# Patient Record
Sex: MALE | Race: WHITE | NOT HISPANIC OR LATINO | Employment: FULL TIME | URBAN - METROPOLITAN AREA
[De-identification: names, ages, dates, MRNs, and addresses within clinical notes are randomized per-mention and may not be internally consistent; named-entity substitution may affect disease eponyms.]

---
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
## 2025-02-14 ENCOUNTER — OFFICE VISIT (OUTPATIENT)
Dept: NEUROLOGY | Facility: CLINIC | Age: 20
End: 2025-02-14
Payer: COMMERCIAL

## 2025-02-14 VITALS
SYSTOLIC BLOOD PRESSURE: 118 MMHG | BODY MASS INDEX: 24.52 KG/M2 | HEIGHT: 72 IN | OXYGEN SATURATION: 98 % | WEIGHT: 181 LBS | HEART RATE: 63 BPM | DIASTOLIC BLOOD PRESSURE: 68 MMHG

## 2025-02-14 DIAGNOSIS — R56.9 SEIZURE-LIKE ACTIVITY: Primary | ICD-10-CM

## 2025-02-14 NOTE — PROGRESS NOTES
Neuro Office Visit      Encounter Date: 2025   Patient Name: Susana Toscano  : 2005   MRN: [id number]   PCP:  Sofia Rapp APRN     Chief Complaint:    Chief Complaint   Patient presents with    New onset seizure       History of Present Illness: Susana Toscano is a 19 y.o. male who is here today in Neurology for seizure.  History of Present Illness  Experienced a seizure-like episode in 2024, while on a break at work. The seizure was witnessed by a bystander.    During the time he had the seizure he was working at [employer] and states he had been working very hard and was somewhat dehydrated he had also had a high intake of caffeine.    He took a break and walked out to the patio and recalls walking back inside after the break, grabbing a souffle, taking a bite, going outside and then losing consciousness.  Bystanders also noted that he took several hits off of his vape pen prior to the seizure activity. Informed that fell to the ground after vaping, but does not believe the vape was the cause of the seizure.    No tongue biting or urinary/bowel incontinence.    The next memory he recalled was being placed on the stretcher and loaded into the ambulance.  By the time he arrived at the emergency department he was back to his normal state of health.    This was the first and only seizure-like event. Has resumed driving and is currently employed at [employer].    No known allergies and no diagnosis of health conditions such as asthma or headaches.     Reports no family history of seizures, dementia, or strokes.     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        SOCIAL  Occupations: Works at [employer] in [place]  Alcohol: Consumes alcohol socially  Tobacco: Does not smoke  Recreational Drugs: Uses marijuana for recreational  purposes  Coffee/Tea/Caffeine-containing Drinks: Consumed five shots of espresso on the day of the incident      ALLERGIES  The patient has no known allergies.    EEG (08/26/2024 08:00)     MRI Brain With & Without Contrast (08/25/2024 17:40)     Subjective      Past Medical History: History reviewed. No pertinent past medical history.    Past Surgical History:   Past Surgical History:   Procedure Laterality Date    TONSILLECTOMY      WISDOM TOOTH EXTRACTION         Family History: History reviewed. No pertinent family history.    Social History:   Social History     Socioeconomic History    Marital status: Single   Tobacco Use    Smoking status: Never    Smokeless tobacco: Never   Vaping Use    Vaping status: Some Days    Substances: Nicotine   Substance and Sexual Activity    Alcohol use: Yes     Comment: social    Drug use: Yes     Types: Marijuana     Comment: recreational       Medications:     Current Outpatient Medications:     ondansetron ODT (ZOFRAN-ODT) 4 MG disintegrating tablet, Place 1 tablet on the tongue Every 8 (Eight) Hours As Needed for Nausea., Disp: 15 tablet, Rfl: 0    Allergies:   No Known Allergies    PHQ-9 Total Score:     STEADI Fall Risk Assessment has not been completed.    Objective     Physical Exam:     Neurological Exam  Mental Status  Awake, alert and oriented to person, place and time. Recent and remote memory are intact. Speech is normal. Language is fluent with no aphasia. Attention and concentration are normal. Fund of knowledge is appropriate for level of education.    Cranial Nerves  CN II: Visual acuity is normal.  CN III, IV, VI: Extraocular movements intact bilaterally. Pupils equal round and reactive to light bilaterally.  CN V: Facial sensation is normal.  CN VII: Full and symmetric facial movement.  CN IX, X: Palate elevates symmetrically  CN XI: Shoulder shrug strength is normal.  CN XII: Tongue midline without atrophy or fasciculations.    Motor  Normal muscle bulk  "throughout. No fasciculations present. Normal muscle tone. Strength is 5/5 throughout all four extremities.    Sensory  Sensation is intact to light touch, pinprick, vibration and proprioception in all four extremities.    Reflexes                                            Right                      Left  Brachioradialis                    2+                         2+  Biceps                                 2+                         2+  Triceps                                2+                         2+  Finger flex                           2+                         2+  Hamstring                            2+                         2+  Patellar                                2+                         2+  Achilles                                2+                         2+    Coordination    Finger-to-nose, rapid alternating movements and heel-to-shin normal bilaterally without dysmetria.    Gait  Normal casual, toe, heel and tandem gait.        Vital Signs:   Vitals:    02/14/25 1341   BP: 118/68   Pulse: 63   SpO2: 98%   Weight: 82.1 kg (181 lb)   Height: 182.9 cm (72\")     Body mass index is 24.55 kg/m².     Results:   Results  Labs:   Anion Gap - N/A  CMP - N/A  CO2 - N/A  Lactic Acid - N/A    Radiology:   Brain MRI, Normal structures    Neurological testing:   EEG, Normal     Imaging:   No Images in the past 120 days found..     Labs:   No results found for: \"CMP\", \"PROTEIN\", \"ANTIMOGAB\", \"VXOMYT4EELA\", \"JCVRESULT\", \"QUANTTBGOLD\", \"CBCDIF\", \"IGGALBSER\"     Assessment / Plan      Assessment/Plan:   Diagnoses and all orders for this visit:    1. Seizure-like activity (Primary)  Comments:  No further seizure activity  Return for follow-up if another seizure occurs         Assessment & Plan  1. Seizure.  The seizure-like episode in August 2024 was likely provoked by dehydration and heat exposure. The MRI of the brain and EEG results were normal, and the neurological examination conducted today was also " normal. He has not experienced any further seizure-like activity since the initial episode. He is advised to maintain adequate hydration and ensure sufficient sleep. If another seizure occurs, he should contact the office immediately for further evaluation and potential initiation of medication.    Patient Education:     Reviewed medications, potential side effects and signs and symptoms to report. Discussed risk versus benefits of treatment plan with patient and/or family-including medications, labs and radiology that may be ordered. Addressed questions and concerns during visit. Patient and/or family verbalized understanding and agree with plan. Instructed to call the office with any questions and report to ER with any life-threatening symptoms.     Follow Up:   Return if symptoms worsen or fail to improve.    I spent 40 minutes caring for Susana on this date of service. This time includes time spent by me in the following activities: preparing for the visit, reviewing tests, performing a medically appropriate examination and/or evaluation, counseling and educating the patient/family/caregiver, documenting information in the medical record, and independently interpreting results and communicating that information with the patient/family/caregiver.        During this visit the following were done:  Labs Reviewed [x]    Labs Ordered []    Radiology Reports Reviewed [x]    Radiology Ordered []    PCP Records Reviewed []    Referring Provider Records Reviewed [x]    ER Records Reviewed []    Hospital Records Reviewed [x]    History Obtained From Family []    Radiology Images Reviewed [x]    Other Reviewed []    Records Requested []      Patient or patient representative verbalized consent for the use of Ambient Listening during the visit with  ROMARIO Chacon for chart documentation. 2/14/2025  16:08 ROMARIO Hunter   [facility] [place] [facility] NEUROLOGY  [address] [place] [address]  DIANDRA [address] [id number] [phone]